# Patient Record
Sex: MALE | Race: OTHER
[De-identification: names, ages, dates, MRNs, and addresses within clinical notes are randomized per-mention and may not be internally consistent; named-entity substitution may affect disease eponyms.]

---

## 2020-08-23 ENCOUNTER — HOSPITAL ENCOUNTER (EMERGENCY)
Dept: HOSPITAL 62 - ER | Age: 30
Discharge: HOME | End: 2020-08-23
Payer: SELF-PAY

## 2020-08-23 VITALS — DIASTOLIC BLOOD PRESSURE: 76 MMHG | SYSTOLIC BLOOD PRESSURE: 125 MMHG

## 2020-08-23 DIAGNOSIS — R11.0: ICD-10-CM

## 2020-08-23 DIAGNOSIS — N13.2: Primary | ICD-10-CM

## 2020-08-23 LAB
ADD MANUAL DIFF: NO
ALBUMIN SERPL-MCNC: 4.8 G/DL (ref 3.5–5)
ALP SERPL-CCNC: 76 U/L (ref 38–126)
ANION GAP SERPL CALC-SCNC: 13 MMOL/L (ref 5–19)
APPEARANCE UR: CLEAR
APTT PPP: YELLOW S
AST SERPL-CCNC: 51 U/L (ref 17–59)
BASOPHILS # BLD AUTO: 0.1 10^3/UL (ref 0–0.2)
BASOPHILS NFR BLD AUTO: 0.7 % (ref 0–2)
BILIRUB DIRECT SERPL-MCNC: 0.3 MG/DL (ref 0–0.4)
BILIRUB SERPL-MCNC: 0.5 MG/DL (ref 0.2–1.3)
BILIRUB UR QL STRIP: NEGATIVE
BUN SERPL-MCNC: 17 MG/DL (ref 7–20)
CALCIUM: 9.8 MG/DL (ref 8.4–10.2)
CHLORIDE SERPL-SCNC: 104 MMOL/L (ref 98–107)
CO2 SERPL-SCNC: 23 MMOL/L (ref 22–30)
EOSINOPHIL # BLD AUTO: 0.4 10^3/UL (ref 0–0.6)
EOSINOPHIL NFR BLD AUTO: 2.8 % (ref 0–6)
ERYTHROCYTE [DISTWIDTH] IN BLOOD BY AUTOMATED COUNT: 13.5 % (ref 11.5–14)
GLUCOSE SERPL-MCNC: 147 MG/DL (ref 75–110)
GLUCOSE UR STRIP-MCNC: NEGATIVE MG/DL
HCT VFR BLD CALC: 46.7 % (ref 37.9–51)
HGB BLD-MCNC: 16.3 G/DL (ref 13.5–17)
KETONES UR STRIP-MCNC: NEGATIVE MG/DL
LYMPHOCYTES # BLD AUTO: 4 10^3/UL (ref 0.5–4.7)
LYMPHOCYTES NFR BLD AUTO: 31.4 % (ref 13–45)
MCH RBC QN AUTO: 29.6 PG (ref 27–33.4)
MCHC RBC AUTO-ENTMCNC: 34.9 G/DL (ref 32–36)
MCV RBC AUTO: 85 FL (ref 80–97)
MONOCYTES # BLD AUTO: 1 10^3/UL (ref 0.1–1.4)
MONOCYTES NFR BLD AUTO: 7.6 % (ref 3–13)
NEUTROPHILS # BLD AUTO: 7.3 10^3/UL (ref 1.7–8.2)
NEUTS SEG NFR BLD AUTO: 57.5 % (ref 42–78)
NITRITE UR QL STRIP: NEGATIVE
PH UR STRIP: 5 [PH] (ref 5–9)
PLATELET # BLD: 305 10^3/UL (ref 150–450)
POTASSIUM SERPL-SCNC: 4.2 MMOL/L (ref 3.6–5)
PROT SERPL-MCNC: 8.1 G/DL (ref 6.3–8.2)
PROT UR STRIP-MCNC: NEGATIVE MG/DL
RBC # BLD AUTO: 5.5 10^6/UL (ref 4.35–5.55)
SP GR UR STRIP: 1.02
TOTAL CELLS COUNTED % (AUTO): 100 %
UROBILINOGEN UR-MCNC: NEGATIVE MG/DL (ref ?–2)
WBC # BLD AUTO: 12.8 10^3/UL (ref 4–10.5)

## 2020-08-23 PROCEDURE — 74176 CT ABD & PELVIS W/O CONTRAST: CPT

## 2020-08-23 PROCEDURE — 96376 TX/PRO/DX INJ SAME DRUG ADON: CPT

## 2020-08-23 PROCEDURE — 96375 TX/PRO/DX INJ NEW DRUG ADDON: CPT

## 2020-08-23 PROCEDURE — 85025 COMPLETE CBC W/AUTO DIFF WBC: CPT

## 2020-08-23 PROCEDURE — 99285 EMERGENCY DEPT VISIT HI MDM: CPT

## 2020-08-23 PROCEDURE — 96374 THER/PROPH/DIAG INJ IV PUSH: CPT

## 2020-08-23 PROCEDURE — 96361 HYDRATE IV INFUSION ADD-ON: CPT

## 2020-08-23 PROCEDURE — 80053 COMPREHEN METABOLIC PANEL: CPT

## 2020-08-23 PROCEDURE — 36415 COLL VENOUS BLD VENIPUNCTURE: CPT

## 2020-08-23 PROCEDURE — 81001 URINALYSIS AUTO W/SCOPE: CPT

## 2020-08-23 NOTE — ER DOCUMENT REPORT
ED GI/





- General


Chief Complaint: Abdominal Pain


Stated Complaint: ABDOMINAL PAIN


Time Seen by Provider: 08/23/20 03:45


Notes: 


Patient is a 29-year-old male that comes to the emergency department for chief 

complaint of sudden onset severe right-sided abdominal pain that started about 1

hour prior to arrival.  Patient reports nausea, patient broke out into a sweat. 

Patient has not vomited, has no dysuria, he does report pain down to the ge

nitals.  Pain radiates around to the right flank as well.  Patient denies fever.

 Patient denies any surgeries, daily medications, or known past medical history.

 Significant other at bedside.








- Related Data


Allergies/Adverse Reactions: 


                                        





No Known Allergies Allergy (Verified 08/23/20 03:41)


   











Past Medical History





- General


Information source: Patient





- Social History


Smoking Status: Never Smoker


Frequency of alcohol use: None


Drug Abuse: None


Lives with: Family


Family History: Reviewed & Not Pertinent


Patient has homicidal ideation: No


Surgical Hx: Negative





- Immunizations


Immunizations up to date: Yes


Hx Diphtheria, Pertussis, Tetanus Vaccination: Yes





Review of Systems





- Review of Systems


Constitutional: No symptoms reported


EENT: No symptoms reported


Cardiovascular: No symptoms reported


Respiratory: No symptoms reported


Gastrointestinal: See HPI


Genitourinary: See HPI


Male Genitourinary: See HPI


Musculoskeletal: No symptoms reported


Skin: No symptoms reported


Hematologic/Lymphatic: No symptoms reported


Neurological/Psychological: No symptoms reported





Physical Exam





- Vital signs


Vitals: 


                                        











Temp Pulse Resp BP Pulse Ox


 


 98 F   81   24 H  157/113 H  96 


 


 08/23/20 03:39  08/23/20 03:39  08/23/20 03:39  08/23/20 03:39  08/23/20 03:39














- Notes


Notes: 


GENERAL: Patient cannot hold still, crying out in pain, diaphoretic, in severe 

distress


HEAD: Normocephalic, atraumatic.


EYES: Pupils equal, round, and reactive to light. Extraocular movements intact.


ENT: Oral mucosa moist, tongue midline. Oropharynx unremarkable. Airway patent. 


NECK: Full range of motion. Supple. Trachea midline. No lymphadenopathy.


LUNGS: Clear to auscultation bilaterally, no wheezes, rales, or rhonchi. No 

respiratory distress. Non-tender chest wall. 


HEART: Regular rate and rhythm. No murmur


ABDOMEN: General mid to lower right-sided abdominal tenderness, remaining 

abdomen benign


GENITOURINARY: No swelling, tenderness, or concerning findings.  Exam performed 

with Nakita MARKS at bedside


EXTREMITIES: Moves all 4 extremities spontaneously. No edema, normal radial and 

dorsalis pedis pulses bilaterally. No cyanosis.


BACK: no cervical, thoracic, lumbar midline tenderness. No saddle anesthesia, 

normal distal neurovascular exam. Moves all extremities in full range of motion.


NEUROLOGICAL: Alert and oriented x3. Normal speech. Cranial nerves II through 

XII grossly intact. Strength 5/5 in all extremities. 


PSYCH: Agitated








Course





- Re-evaluation


Re-evalutation: 


After initial medications patient is improved but has not had complete 

resolution of his symptoms.  CBC, chemistry unremarkable, urine is still 

pending.  CAT scan was performed after discussing options because patient has 

never had a kidney stone before and has persistent pain, this does show 1 mm 

passing stone with punctate nephrolithiasis essentially otherwise.  I discussed 

the multiple stones, passing a stone, after additional medication symptoms 

finally resolved.  Urine does not show infection.  Discussed details, follow-up,

return precautions at length, patient and significant other state appreciation 

and agreement.  Stable and well-appearing at time of discharge.





- Vital Signs


Vital signs: 


                                        











Temp Pulse Resp BP Pulse Ox


 


 98.7 F   66   14   125/76   98 


 


 08/23/20 07:07  08/23/20 07:07  08/23/20 07:07  08/23/20 07:07  08/23/20 07:07














- Laboratory


Result Diagrams: 


                                 08/23/20 04:00





                                 08/23/20 04:00


Laboratory results interpreted by me: 


                                        











  08/23/20 08/23/20 08/23/20





  04:00 04:00 05:50


 


WBC  12.8 H  


 


Glucose   147 H 


 


ALT   99 H 


 


Urine Blood    MODERATE H














Discharge





- Discharge


Clinical Impression: 


 Ureterolithiasis, Flank pain





Abdominal pain


Qualifiers:


 Abdominal location: lower abdomen, unspecified Qualified Code(s): R10.30 - 

Lower abdominal pain, unspecified





Condition: Stable


Disposition: HOME, SELF-CARE


Additional Instructions: 


You are passing a 1 mm kidney stone on the right side.  You also have additional

kidney stones in your kidneys which may pass in the future.  You should build to

pass this 1 over the next several days.  Drink plenty of water, take the 

medications as prescribed if needed.  Follow-up with primary care.





Return if you worsen including uncontrolled vomiting, severe worsening pain, 

fever, or any other concerning symptoms.


Prescriptions: 


Ibuprofen [Motrin 600 mg Tablet] 600 mg PO Q6HP PRN #30 tablet


 PRN Reason: 


Oxycodone HCl/Acetaminophen [Percocet 5-325 mg Tablet] 1 tab PO TID PRN #15 tab


 PRN Reason: 


Promethazine HCl [Phenergan 25 mg Tablet] 25 mg PO Q6H PRN #15 tablet


 PRN Reason: 


Forms:  Return to Work

## 2025-07-09 NOTE — RADIOLOGY REPORT (SQ)
CLINICAL HISTORY:  severe right abdominal pain 



COMPARISON: None.



TECHNIQUE: CT ABDOMEN PELVIS WITHOUT IV CONTRAST on 8/23/2020

3:48 AM CDT



This exam was performed according to our departmental

dose-optimization program, which includes automated exposure

control, adjustment of the mA and/or kV according to patient size

and/or use of iterative reconstruction technique.



FINDINGS: 



Lower lungs are clear.



Abdomen: Liver is fatty in attenuation. There is no biliary

dilatation. Gallbladder is unremarkable. The pancreas and spleen

are normal in appearance. Adrenal glands are normal. There is a 2

mm lower pole left renal calculus. There are several punctate

questionable lower pole right renal calculi. There is mild right

hydronephrosis secondary to a 1 mm right UVJ calculus.



Abdominal aorta is normal in course and caliber without aneurysm.

There is no free air. There is no retroperitoneal adenopathy.



Pelvis: There is no bowel obstruction. Urinary bladder is

unremarkable. There is no free fluid. Appendix is normal.



Skeleton: There are no acute osseous findings. No suspicious bony

lesions.



IMPRESSION: 



Mildly obstructing 1 mm right UVJ calculus. Please have Saranya Kurtis draw BNP lab test next time you have labs and fax to [phone].    We will see you back in 6 months with an echo.